# Patient Record
Sex: FEMALE | Race: WHITE | URBAN - METROPOLITAN AREA
[De-identification: names, ages, dates, MRNs, and addresses within clinical notes are randomized per-mention and may not be internally consistent; named-entity substitution may affect disease eponyms.]

---

## 2017-09-28 ENCOUNTER — HOSPITAL ENCOUNTER (OUTPATIENT)
Facility: MEDICAL CENTER | Age: 42
End: 2017-09-28
Payer: COMMERCIAL

## 2017-09-28 LAB
BDY FAT % MEASURED: 40.7 %
BP DIAS: 70 MMHG
BP SYS: 110 MMHG
DIABETES HTDIA: NO
EVENT NAME HTEVT: NORMAL
HYPERTENSION HTHYP: NO
SCREENING LOC CITY HTCIT: NORMAL
SCREENING LOC STATE HTSTA: NORMAL
SCREENING LOCATION HTLOC: NORMAL
SUBSCRIBER ID HTSID: NORMAL

## 2017-09-29 LAB
ALBUMIN SERPL BCP-MCNC: 4.3 G/DL (ref 3.2–4.9)
ALBUMIN/GLOB SERPL: 1.7 G/DL
ALP SERPL-CCNC: 60 U/L (ref 30–99)
ALT SERPL-CCNC: 18 U/L (ref 2–50)
ANION GAP SERPL CALC-SCNC: 6 MMOL/L (ref 0–11.9)
AST SERPL-CCNC: 22 U/L (ref 12–45)
BILIRUB SERPL-MCNC: 0.6 MG/DL (ref 0.1–1.5)
BUN SERPL-MCNC: 12 MG/DL (ref 8–22)
CALCIUM SERPL-MCNC: 9.4 MG/DL (ref 8.5–10.5)
CHLORIDE SERPL-SCNC: 109 MMOL/L (ref 96–112)
CHOLEST SERPL-MCNC: 190 MG/DL (ref 100–199)
CO2 SERPL-SCNC: 23 MMOL/L (ref 20–33)
CREAT SERPL-MCNC: 0.62 MG/DL (ref 0.5–1.4)
GFR SERPL CREATININE-BSD FRML MDRD: >60 ML/MIN/1.73 M 2
GLOBULIN SER CALC-MCNC: 2.5 G/DL (ref 1.9–3.5)
GLUCOSE SERPL-MCNC: 93 MG/DL (ref 65–99)
HDLC SERPL-MCNC: 72 MG/DL
LDLC SERPL CALC-MCNC: 106 MG/DL
POTASSIUM SERPL-SCNC: 3.8 MMOL/L (ref 3.6–5.5)
PROT SERPL-MCNC: 6.8 G/DL (ref 6–8.2)
SODIUM SERPL-SCNC: 138 MMOL/L (ref 135–145)
TRIGL SERPL-MCNC: 58 MG/DL (ref 0–149)

## 2019-07-30 ENCOUNTER — APPOINTMENT (RX ONLY)
Dept: URBAN - METROPOLITAN AREA CLINIC 22 | Facility: CLINIC | Age: 44
Setting detail: DERMATOLOGY
End: 2019-07-30

## 2019-07-30 DIAGNOSIS — L81.4 OTHER MELANIN HYPERPIGMENTATION: ICD-10-CM

## 2019-07-30 DIAGNOSIS — D18.0 HEMANGIOMA: ICD-10-CM

## 2019-07-30 DIAGNOSIS — L82.1 OTHER SEBORRHEIC KERATOSIS: ICD-10-CM

## 2019-07-30 DIAGNOSIS — D22 MELANOCYTIC NEVI: ICD-10-CM

## 2019-07-30 DIAGNOSIS — Z71.89 OTHER SPECIFIED COUNSELING: ICD-10-CM

## 2019-07-30 PROBLEM — D22.62 MELANOCYTIC NEVI OF LEFT UPPER LIMB, INCLUDING SHOULDER: Status: ACTIVE | Noted: 2019-07-30

## 2019-07-30 PROBLEM — D18.01 HEMANGIOMA OF SKIN AND SUBCUTANEOUS TISSUE: Status: ACTIVE | Noted: 2019-07-30

## 2019-07-30 PROBLEM — D22.5 MELANOCYTIC NEVI OF TRUNK: Status: ACTIVE | Noted: 2019-07-30

## 2019-07-30 PROBLEM — D22.61 MELANOCYTIC NEVI OF RIGHT UPPER LIMB, INCLUDING SHOULDER: Status: ACTIVE | Noted: 2019-07-30

## 2019-07-30 PROCEDURE — ? COUNSELING

## 2019-07-30 PROCEDURE — 99203 OFFICE O/P NEW LOW 30 MIN: CPT

## 2019-07-30 ASSESSMENT — LOCATION SIMPLE DESCRIPTION DERM
LOCATION SIMPLE: RIGHT UPPER BACK
LOCATION SIMPLE: RIGHT FOREARM
LOCATION SIMPLE: LEFT FOREARM
LOCATION SIMPLE: ABDOMEN
LOCATION SIMPLE: RIGHT LOWER BACK

## 2019-07-30 ASSESSMENT — LOCATION ZONE DERM
LOCATION ZONE: TRUNK
LOCATION ZONE: ARM

## 2019-07-30 ASSESSMENT — LOCATION DETAILED DESCRIPTION DERM
LOCATION DETAILED: LEFT PROXIMAL DORSAL FOREARM
LOCATION DETAILED: EPIGASTRIC SKIN
LOCATION DETAILED: RIGHT MEDIAL UPPER BACK
LOCATION DETAILED: LEFT DISTAL DORSAL FOREARM
LOCATION DETAILED: RIGHT SUPERIOR MEDIAL MIDBACK
LOCATION DETAILED: RIGHT PROXIMAL DORSAL FOREARM
LOCATION DETAILED: RIGHT SUPERIOR UPPER BACK

## 2020-12-17 ENCOUNTER — HOSPITAL ENCOUNTER (OUTPATIENT)
Dept: LAB | Facility: MEDICAL CENTER | Age: 45
End: 2020-12-17
Payer: COMMERCIAL

## 2020-12-17 LAB — COVID ORDER STATUS COVID19: NORMAL

## 2020-12-17 PROCEDURE — U0003 INFECTIOUS AGENT DETECTION BY NUCLEIC ACID (DNA OR RNA); SEVERE ACUTE RESPIRATORY SYNDROME CORONAVIRUS 2 (SARS-COV-2) (CORONAVIRUS DISEASE [COVID-19]), AMPLIFIED PROBE TECHNIQUE, MAKING USE OF HIGH THROUGHPUT TECHNOLOGIES AS DESCRIBED BY CMS-2020-01-R: HCPCS

## 2020-12-18 LAB
SARS-COV-2 RNA RESP QL NAA+PROBE: NOTDETECTED
SPECIMEN SOURCE: NORMAL

## 2021-11-15 ENCOUNTER — OFFICE VISIT (OUTPATIENT)
Dept: URGENT CARE | Facility: CLINIC | Age: 46
End: 2021-11-15
Payer: COMMERCIAL

## 2021-11-15 ENCOUNTER — HOSPITAL ENCOUNTER (OUTPATIENT)
Facility: MEDICAL CENTER | Age: 46
End: 2021-11-15
Attending: PHYSICIAN ASSISTANT
Payer: COMMERCIAL

## 2021-11-15 VITALS
HEART RATE: 80 BPM | TEMPERATURE: 97.1 F | HEIGHT: 64 IN | BODY MASS INDEX: 37.56 KG/M2 | OXYGEN SATURATION: 97 % | SYSTOLIC BLOOD PRESSURE: 122 MMHG | WEIGHT: 220 LBS | RESPIRATION RATE: 16 BRPM | DIASTOLIC BLOOD PRESSURE: 84 MMHG

## 2021-11-15 DIAGNOSIS — B34.9 NONSPECIFIC SYNDROME SUGGESTIVE OF VIRAL ILLNESS: ICD-10-CM

## 2021-11-15 LAB
COVID ORDER STATUS COVID19: NORMAL
INT CON NEG: NORMAL
INT CON POS: NORMAL
S PYO AG THROAT QL: NEGATIVE

## 2021-11-15 PROCEDURE — 87880 STREP A ASSAY W/OPTIC: CPT | Performed by: PHYSICIAN ASSISTANT

## 2021-11-15 PROCEDURE — U0003 INFECTIOUS AGENT DETECTION BY NUCLEIC ACID (DNA OR RNA); SEVERE ACUTE RESPIRATORY SYNDROME CORONAVIRUS 2 (SARS-COV-2) (CORONAVIRUS DISEASE [COVID-19]), AMPLIFIED PROBE TECHNIQUE, MAKING USE OF HIGH THROUGHPUT TECHNOLOGIES AS DESCRIBED BY CMS-2020-01-R: HCPCS

## 2021-11-15 PROCEDURE — U0005 INFEC AGEN DETEC AMPLI PROBE: HCPCS

## 2021-11-15 PROCEDURE — 99213 OFFICE O/P EST LOW 20 MIN: CPT | Performed by: PHYSICIAN ASSISTANT

## 2021-11-15 RX ORDER — VALACYCLOVIR HYDROCHLORIDE 500 MG/1
TABLET, FILM COATED ORAL
COMMUNITY
Start: 2021-10-01

## 2021-11-15 ASSESSMENT — ENCOUNTER SYMPTOMS
CONSTIPATION: 0
SHORTNESS OF BREATH: 0
COUGH: 1
FEVER: 0
CHILLS: 0
DIARRHEA: 0
MYALGIAS: 0
NAUSEA: 0
EYE PAIN: 0
ABDOMINAL PAIN: 0
HEADACHES: 0
VOMITING: 0
SORE THROAT: 1

## 2021-11-15 NOTE — PROGRESS NOTES
"Subjective:   Renetta Shaver is a 46 y.o. female who presents for Cough (x 3 days, cough, sore throat.  Home test for COVID was negative yesterday.)      This a pleasant 46-year-old female who presents with a 3-day history of sore throat, mild dry cough, mild nasal congestion.  She has upcoming travel and wants to make sure that she is able to safely travel does not require antibiotics.  She notes mildly decreased energy level, no body aches or gastrointestinal symptoms.  She is vaccinated against Covid, has no known sick contacts.  She denies any difficulty swallowing.      Review of Systems   Constitutional: Positive for malaise/fatigue. Negative for chills and fever.   HENT: Positive for congestion and sore throat. Negative for ear pain.    Eyes: Negative for pain.   Respiratory: Positive for cough. Negative for shortness of breath.    Cardiovascular: Negative for chest pain.   Gastrointestinal: Negative for abdominal pain, constipation, diarrhea, nausea and vomiting.   Genitourinary: Negative for dysuria.   Musculoskeletal: Negative for myalgias.   Skin: Negative for rash.   Neurological: Negative for headaches.       Medications, Allergies, and current problem list reviewed today in Epic.     Objective:     /84 (BP Location: Left arm, Patient Position: Sitting, BP Cuff Size: Large adult)   Pulse 80   Temp 36.2 °C (97.1 °F) (Temporal)   Resp 16   Ht 1.626 m (5' 4\")   Wt 99.8 kg (220 lb)   SpO2 97%     Physical Exam  Vitals reviewed.   Constitutional:       Appearance: Normal appearance.   HENT:      Head: Normocephalic and atraumatic.      Right Ear: External ear normal.      Left Ear: External ear normal.      Nose: Rhinorrhea present.      Mouth/Throat:      Mouth: Mucous membranes are moist.      Pharynx: No oropharyngeal exudate or posterior oropharyngeal erythema.      Comments: POST NASAL DRIP  Eyes:      Conjunctiva/sclera: Conjunctivae normal.   Cardiovascular:      Rate and Rhythm: Normal " "rate.   Pulmonary:      Effort: Pulmonary effort is normal.      Breath sounds: Normal breath sounds.   Lymphadenopathy:      Cervical: No cervical adenopathy.   Skin:     General: Skin is warm and dry.      Capillary Refill: Capillary refill takes less than 2 seconds.   Neurological:      Mental Status: She is alert and oriented to person, place, and time.         Assessment/Plan:     Diagnosis and associated orders:     1. Nonspecific syndrome suggestive of viral illness  COVID/SARS CoV-2 PCR    POCT Rapid Strep A      Comments/MDM:     • Rapid strep negative  Patient's vital signs are reassuring and they appear hemodynamically stable and do not require higher level care at this time  I discussed self isolation and provided printed instructions (if applicable)  I discussed ER precautions and provided printed instructions (if applicable)  I educated the patient on possibility of a false-negative test and indications for repeat testing  I instructed the patient to try to follow up with their PCP (if applicable) for follow up care  I discussed the possibly of \"breakthrough infections\" in fully vaccinated people  The patient will receive results via MyChart (\"detected\" is a positive result, \"not detected\" indicates a negative result)  If requested, I provided the patient with a work note to provide to their employer or school regarding returning to work and discontinuation of self isolation.  All questions were answered and patient demonstrated verbal understanding of above.  I followed all reasonable PPE precautions during this encounter including but not limited to use of an N95 mask, gloves, and gown if indicated.             Differential diagnosis, natural history, supportive care, and indications for immediate follow-up discussed.    Advised the patient to follow-up with the primary care physician for recheck, reevaluation, and consideration of further management.    Please note that this dictation was created " using voice recognition software. I have made a reasonable attempt to correct obvious errors, but I expect that there are errors of grammar and possibly content that I did not discover before finalizing the note.    This note was electronically signed by Carter Wyman PA-C

## 2021-11-16 LAB
SARS-COV-2 RNA RESP QL NAA+PROBE: NOTDETECTED
SPECIMEN SOURCE: NORMAL

## 2022-02-28 ENCOUNTER — APPOINTMENT (OUTPATIENT)
Dept: RADIOLOGY | Facility: OTHER | Age: 47
End: 2022-02-28
Attending: FAMILY MEDICINE
Payer: COMMERCIAL

## 2022-02-28 ENCOUNTER — OFFICE VISIT (OUTPATIENT)
Dept: MEDICAL GROUP | Facility: OTHER | Age: 47
End: 2022-02-28
Payer: COMMERCIAL

## 2022-02-28 VITALS
SYSTOLIC BLOOD PRESSURE: 112 MMHG | HEART RATE: 90 BPM | OXYGEN SATURATION: 97 % | WEIGHT: 222 LBS | DIASTOLIC BLOOD PRESSURE: 70 MMHG | BODY MASS INDEX: 39.34 KG/M2 | RESPIRATION RATE: 14 BRPM | TEMPERATURE: 98.4 F | HEIGHT: 63 IN

## 2022-02-28 DIAGNOSIS — M54.32 SCIATICA OF LEFT SIDE: ICD-10-CM

## 2022-02-28 DIAGNOSIS — M54.16 LUMBAR RADICULOPATHY, ACUTE: ICD-10-CM

## 2022-02-28 PROCEDURE — 99213 OFFICE O/P EST LOW 20 MIN: CPT | Performed by: FAMILY MEDICINE

## 2022-02-28 PROCEDURE — 72100 X-RAY EXAM L-S SPINE 2/3 VWS: CPT | Mod: TC,FY | Performed by: FAMILY MEDICINE

## 2022-02-28 PROCEDURE — 73502 X-RAY EXAM HIP UNI 2-3 VIEWS: CPT | Mod: TC,FY,LT | Performed by: FAMILY MEDICINE

## 2022-02-28 RX ORDER — METHYLPREDNISOLONE 4 MG/1
TABLET ORAL
Qty: 21 TABLET | Refills: 0 | Status: SHIPPED | OUTPATIENT
Start: 2022-02-28

## 2022-02-28 ASSESSMENT — ENCOUNTER SYMPTOMS
GASTROINTESTINAL NEGATIVE: 1
BACK PAIN: 1
CONSTITUTIONAL NEGATIVE: 1

## 2022-02-28 NOTE — PROGRESS NOTES
" Subjective:   Renetta Shaver is a 46 y.o. female here for the evaluation and management of Follow-Up (LT SIDE LEG PAIN NUMBNESS, )    Left lumbar pain with associated left leg discomfort onset spontaneously approximately 6 weeks ago with pain levels moderate and variable with higher pain levels today.  Pain is located in the left lumbar area with radiation toward the left hip and into the left thigh with a throbbing pain and associated tingling and a feeling of numbness over the lateral and anterior thigh.  She reports no bowel or bladder dysfunction no distal numbness or weakness, no systemic symptoms specifically denies fever.  She reports prior to the onset of her back discomfort she did have COVID-19    Problem   Lumbar Radiculopathy, Acute       Review of Systems   Constitutional: Negative.    Gastrointestinal: Negative.    Genitourinary: Negative.    Musculoskeletal: Positive for back pain and joint pain.       Current Outpatient Medications   Medication Sig Dispense Refill   • methylPREDNISolone (MEDROL DOSEPAK) 4 MG Tablet Therapy Pack As directed on the packaging label. 21 Tablet 0   • valACYclovir (VALTREX) 500 MG Tab        No current facility-administered medications for this visit.     Allergies  Patient has no known allergies.    History reviewed. No pertinent past medical history.  Patient Active Problem List    Diagnosis Date Noted   • Lumbar radiculopathy, acute 02/28/2022       Past Surgical History  History reviewed. No pertinent surgical history.     Objective:     Vitals:    02/28/22 1340   BP: 112/70   BP Location: Right arm   Patient Position: Sitting   BP Cuff Size: Adult   Pulse: 90   Resp: 14   Temp: 36.9 °C (98.4 °F)   TempSrc: Temporal   SpO2: 97%   Weight: 101 kg (222 lb)   Height: 1.6 m (5' 3\")     Body mass index is 39.33 kg/m².     Physical Exam  Constitutional:       Appearance: Normal appearance.   HENT:      Head: Normocephalic.   Eyes:      Extraocular Movements: Extraocular " movements intact.      Conjunctiva/sclera: Conjunctivae normal.   Neurological:      Mental Status: She is alert. Mental status is at baseline.   Psychiatric:         Mood and Affect: Mood normal.         Behavior: Behavior normal.       Lumbar exam-normal inspection, mild tenderness to palpation of the left lumbar area, flexion 100 degrees with extension to 20 degrees, normal strength with heel and toe walking, deep tendon and Achilles reflexes are +2 bilaterally, in her lower legs she has normal sensation to light touch, her left thigh demonstrates slightly decreased sensation light touch,    Patient is found standing and found it uncomfortable to sit  Assessment and Plan:   Renetta Shaver is a 46 y.o. female with a Follow-Up (LT SIDE LEG PAIN NUMBNESS, )     The following was discussed with the patient today.    Problem List Items Addressed This Visit     Lumbar radiculopathy, acute    Relevant Medications    methylPREDNISolone (MEDROL DOSEPAK) 4 MG Tablet Therapy Pack    Other Relevant Orders    MR-LUMBAR SPINE-W/O        Imaging reviewed and discussed with straightening of the normal lumbar lordotic curve, hip exam on imaging appears relatively benign with question of prior GYN procedures and placement of surgical hardware, await radiology's interpretation.  Discussed treatment options with suspicion of a lumbar radiculopathy is the best explanation for lumbar and left leg pain.  Recommended a trial of corticosteroids given she is not responded to prescription strength anti-inflammatories.  I described the mechanism of action and common side effects with steroid-based medications.  Additionally I recommend proceeding with lumbar MRI given clinical uncertainty and more than 6 weeks of symptoms without improvement  Followup: No follow-ups on file.    Gennaro Sanders M.D.    Please note that this dictation was created using voice recognition software. I have made every reasonable attempt to correct obvious errors,  but I expect that there are errors of grammar and possibly content that I did not discover before finalizing the note.

## 2022-03-14 ENCOUNTER — HOSPITAL ENCOUNTER (OUTPATIENT)
Dept: RADIOLOGY | Facility: MEDICAL CENTER | Age: 47
End: 2022-03-14
Attending: FAMILY MEDICINE
Payer: COMMERCIAL

## 2022-03-14 DIAGNOSIS — M54.16 LUMBAR RADICULOPATHY, ACUTE: ICD-10-CM

## 2022-03-14 PROCEDURE — 72148 MRI LUMBAR SPINE W/O DYE: CPT

## 2022-03-25 ENCOUNTER — OFFICE VISIT (OUTPATIENT)
Dept: MEDICAL GROUP | Facility: OTHER | Age: 47
End: 2022-03-25
Payer: COMMERCIAL

## 2022-03-25 VITALS
SYSTOLIC BLOOD PRESSURE: 104 MMHG | TEMPERATURE: 99 F | BODY MASS INDEX: 38.07 KG/M2 | DIASTOLIC BLOOD PRESSURE: 72 MMHG | WEIGHT: 223 LBS | HEART RATE: 71 BPM | HEIGHT: 64 IN | OXYGEN SATURATION: 97 %

## 2022-03-25 DIAGNOSIS — M54.16 LUMBAR RADICULOPATHY, ACUTE: ICD-10-CM

## 2022-03-25 PROCEDURE — 99213 OFFICE O/P EST LOW 20 MIN: CPT | Performed by: FAMILY MEDICINE

## 2022-03-25 ASSESSMENT — PATIENT HEALTH QUESTIONNAIRE - PHQ9: CLINICAL INTERPRETATION OF PHQ2 SCORE: 0

## 2022-03-25 ASSESSMENT — ENCOUNTER SYMPTOMS
FOCAL WEAKNESS: 0
BACK PAIN: 1
CONSTITUTIONAL NEGATIVE: 1
SENSORY CHANGE: 1

## 2022-03-25 NOTE — PROGRESS NOTES
" Subjective:   Renetta Shaver is a 46 y.o. female here for the evaluation and management of Follow-Up (Follow up MRI)    Lumbar radiculopathy-ongoing discomfort, moderation with use of Medrol Dosepak, left-sided with radiation the lateral aspect of the left leg with associated numbness over the lateral left thigh  No problems updated.    Review of Systems   Constitutional: Negative.    Musculoskeletal: Positive for back pain.   Neurological: Positive for sensory change. Negative for focal weakness.       Current Outpatient Medications   Medication Sig Dispense Refill   • methylPREDNISolone (MEDROL DOSEPAK) 4 MG Tablet Therapy Pack As directed on the packaging label. 21 Tablet 0   • valACYclovir (VALTREX) 500 MG Tab        No current facility-administered medications for this visit.     Allergies  Patient has no known allergies.    No past medical history on file.  Patient Active Problem List    Diagnosis Date Noted   • Lumbar radiculopathy, acute 02/28/2022       Past Surgical History  No past surgical history on file.     Objective:     Vitals:    03/25/22 1102   BP: 104/72   BP Location: Right arm   Patient Position: Sitting   Pulse: 71   Temp: 37.2 °C (99 °F)   SpO2: 97%   Weight: 101 kg (223 lb)   Height: 1.626 m (5' 4\")     Body mass index is 38.28 kg/m².     Physical Exam  Constitutional:       Appearance: Normal appearance.   HENT:      Head: Normocephalic.   Eyes:      Extraocular Movements: Extraocular movements intact.      Conjunctiva/sclera: Conjunctivae normal.   Neurological:      Mental Status: She is alert. Mental status is at baseline.   Psychiatric:         Mood and Affect: Mood normal.         Behavior: Behavior normal.       Lumbar exam-normal inspection with mild tenderness with palpation across the lumbosacral junction, flexion 80 degrees with extension 20 degrees, normal strength with heel and toe walking bilaterally, sensation is decreased to light touch over the lateral aspect of the left " leg  Assessment and Plan:   Renetta Shaver is a 46 y.o. female with a Follow-Up (Follow up MRI)     The following was discussed with the patient today.    Problem List Items Addressed This Visit     Lumbar radiculopathy, acute    Relevant Orders    Referral to Physical Therapy        Reviewed and discussed imaging findings, I agree with the radiologist she does have neuroforaminal narrowing at the L2-3 and L3-4 levels, discussed treatment options with recommendation for physical therapy referral and consideration for physiatry evaluation if she is not making progress by next visit  Followup: Return in about 4 weeks (around 4/22/2022).    Gennaro Sanders M.D.    Please note that this dictation was created using voice recognition software. I have made every reasonable attempt to correct obvious errors, but I expect that there are errors of grammar and possibly content that I did not discover before finalizing the note.

## 2022-04-22 ENCOUNTER — APPOINTMENT (OUTPATIENT)
Dept: MEDICAL GROUP | Facility: OTHER | Age: 47
End: 2022-04-22
Payer: COMMERCIAL

## 2022-05-06 ENCOUNTER — OFFICE VISIT (OUTPATIENT)
Dept: MEDICAL GROUP | Facility: OTHER | Age: 47
End: 2022-05-06
Payer: COMMERCIAL

## 2022-05-06 VITALS
TEMPERATURE: 97.7 F | BODY MASS INDEX: 37.75 KG/M2 | WEIGHT: 221.1 LBS | RESPIRATION RATE: 16 BRPM | OXYGEN SATURATION: 99 % | SYSTOLIC BLOOD PRESSURE: 104 MMHG | HEIGHT: 64 IN | DIASTOLIC BLOOD PRESSURE: 68 MMHG | HEART RATE: 62 BPM

## 2022-05-06 DIAGNOSIS — M54.16 LUMBAR RADICULOPATHY, ACUTE: ICD-10-CM

## 2022-05-06 PROCEDURE — 99213 OFFICE O/P EST LOW 20 MIN: CPT | Performed by: FAMILY MEDICINE

## 2022-05-06 ASSESSMENT — ENCOUNTER SYMPTOMS
BACK PAIN: 1
CONSTITUTIONAL NEGATIVE: 1
NEUROLOGICAL NEGATIVE: 1

## 2022-05-06 NOTE — PROGRESS NOTES
" Subjective:   Renetta Shaver is a 46 y.o. female here for the evaluation and management of Follow-Up (Pt is here to follow up on her lower back pain)    Lumbar pain with radiculopathy-significant improvement with conservative care including initial Medrol Dosepak and subsequent physical therapy, left leg pain has resolved with no residual neurologic symptoms.  She has some mild discomfort located in the left lumbar area but reports she is improving with conservative care.  She is dramatically better overall  No problems updated.    Review of Systems   Constitutional: Negative.    Musculoskeletal: Positive for back pain.   Neurological: Negative.        Current Outpatient Medications   Medication Sig Dispense Refill   • methylPREDNISolone (MEDROL DOSEPAK) 4 MG Tablet Therapy Pack As directed on the packaging label. (Patient not taking: Reported on 5/6/2022) 21 Tablet 0   • valACYclovir (VALTREX) 500 MG Tab  (Patient not taking: Reported on 5/6/2022)       No current facility-administered medications for this visit.     Allergies  Patient has no known allergies.    No past medical history on file.  Patient Active Problem List    Diagnosis Date Noted   • Lumbar radiculopathy, acute 02/28/2022       Past Surgical History  No past surgical history on file.     Objective:     Vitals:    05/06/22 0933   BP: 104/68   BP Location: Right arm   Patient Position: Sitting   BP Cuff Size: Large adult   Pulse: 62   Resp: 16   Temp: 36.5 °C (97.7 °F)   TempSrc: Temporal   SpO2: 99%   Weight: 100 kg (221 lb 1.6 oz)   Height: 1.626 m (5' 4\")     Body mass index is 37.95 kg/m².     Physical Exam  Constitutional:       Appearance: Normal appearance.   HENT:      Head: Normocephalic.   Eyes:      Extraocular Movements: Extraocular movements intact.      Conjunctiva/sclera: Conjunctivae normal.   Neurological:      Mental Status: She is alert. Mental status is at baseline.   Psychiatric:         Mood and Affect: Mood normal.         " Behavior: Behavior normal.     Lumbar exam-normal inspection, mild left lumbosacral junction tenderness on palpation, flexion under degrees, extension grossly normal, normal strength with heel and toe walking    Assessment and Plan:   Renetta Shaver is a 46 y.o. female with a Follow-Up (Pt is here to follow up on her lower back pain)     The following was discussed with the patient today.    Problem List Items Addressed This Visit     Lumbar radiculopathy, acute        Clinically improving with conservative care, no significant radicular symptoms currently, I invited her to complete her physical therapy and she can follow-up as needed  Followup: No follow-ups on file.    Gennaro Sanders M.D.    Please note that this dictation was created using voice recognition software. I have made every reasonable attempt to correct obvious errors, but I expect that there are errors of grammar and possibly content that I did not discover before finalizing the note.